# Patient Record
Sex: FEMALE | Race: WHITE | HISPANIC OR LATINO
[De-identification: names, ages, dates, MRNs, and addresses within clinical notes are randomized per-mention and may not be internally consistent; named-entity substitution may affect disease eponyms.]

---

## 2022-04-29 PROBLEM — Z00.00 ENCOUNTER FOR PREVENTIVE HEALTH EXAMINATION: Status: ACTIVE | Noted: 2022-04-29

## 2022-05-02 ENCOUNTER — APPOINTMENT (OUTPATIENT)
Dept: PEDIATRIC ORTHOPEDIC SURGERY | Facility: CLINIC | Age: 47
End: 2022-05-02
Payer: COMMERCIAL

## 2022-05-02 VITALS — BODY MASS INDEX: 26.68 KG/M2 | HEIGHT: 62 IN | WEIGHT: 145 LBS

## 2022-05-02 DIAGNOSIS — Z82.49 FAMILY HISTORY OF ISCHEMIC HEART DISEASE AND OTHER DISEASES OF THE CIRCULATORY SYSTEM: ICD-10-CM

## 2022-05-02 PROCEDURE — 72100 X-RAY EXAM L-S SPINE 2/3 VWS: CPT

## 2022-05-02 PROCEDURE — 99202 OFFICE O/P NEW SF 15 MIN: CPT

## 2022-05-02 RX ORDER — CYCLOBENZAPRINE HYDROCHLORIDE 10 MG/1
10 TABLET, FILM COATED ORAL DAILY
Qty: 30 | Refills: 0 | Status: ACTIVE | COMMUNITY
Start: 2022-05-02 | End: 1900-01-01

## 2022-05-02 NOTE — ASSESSMENT
[FreeTextEntry1] : Impression: Lumbar radiculitis.\par \par She will continue with gabapentin and cyclobenzaprine.  I have placed her on a 2-day course of prednisone with GI precautions sliding scale.  She will go back to physical therapy I will see her in 1 month the potential for MRI and pain management has been discussed

## 2022-05-03 RX ORDER — PREDNISONE 10 MG/1
10 TABLET ORAL 3 TIMES DAILY
Qty: 60 | Refills: 0 | Status: ACTIVE | COMMUNITY
Start: 2022-05-03 | End: 1900-01-01

## 2022-05-20 ENCOUNTER — APPOINTMENT (OUTPATIENT)
Dept: PEDIATRIC ORTHOPEDIC SURGERY | Facility: CLINIC | Age: 47
End: 2022-05-20
Payer: COMMERCIAL

## 2022-05-20 VITALS — WEIGHT: 145 LBS | BODY MASS INDEX: 26.68 KG/M2 | HEIGHT: 62 IN

## 2022-05-20 PROCEDURE — 99212 OFFICE O/P EST SF 10 MIN: CPT

## 2022-05-20 RX ORDER — MELOXICAM 15 MG/1
15 TABLET ORAL
Qty: 30 | Refills: 1 | Status: ACTIVE | COMMUNITY
Start: 2022-05-20 | End: 1900-01-01

## 2022-05-20 NOTE — HISTORY OF PRESENT ILLNESS
[de-identified] : She isThis 46-year-old returns she is a lot better with regards to pain relief following the 2-week course of prednisone.  She does still however have numbness and tingling radiating into the right lower extremity to the foot functioning a lot better.

## 2022-05-20 NOTE — ASSESSMENT
[FreeTextEntry1] : Impression: Lumbar radiculitis rule out herniated disc.\par \par She is scheduled to start a physical therapy this coming Monday.  She will discontinue the diclofenac and I placed her on Mobic.  She will return in 1 month.  MRI at this time we will hold off on as she is improving.

## 2022-05-20 NOTE — PHYSICAL EXAM
[de-identified] : On exam she walks well she has normal stance motion in the lumbar segment is much improved less spasm and tenderness straight leg raising is uncomfortable on the right though it is negative she remains neurologically intact

## 2022-05-31 ENCOUNTER — APPOINTMENT (OUTPATIENT)
Dept: PEDIATRIC ORTHOPEDIC SURGERY | Facility: CLINIC | Age: 47
End: 2022-05-31

## 2022-06-16 ENCOUNTER — APPOINTMENT (OUTPATIENT)
Dept: PEDIATRIC ORTHOPEDIC SURGERY | Facility: CLINIC | Age: 47
End: 2022-06-16
Payer: COMMERCIAL

## 2022-06-16 VITALS — HEIGHT: 62 IN | BODY MASS INDEX: 26.68 KG/M2 | WEIGHT: 145 LBS

## 2022-06-16 PROCEDURE — 99212 OFFICE O/P EST SF 10 MIN: CPT

## 2022-06-16 RX ORDER — MELOXICAM 15 MG/1
15 TABLET ORAL
Qty: 30 | Refills: 1 | Status: ACTIVE | COMMUNITY
Start: 2022-06-16 | End: 1900-01-01

## 2022-06-16 NOTE — ASSESSMENT
[FreeTextEntry1] : Impression: Lumbar radiculitis.\par \par She will continue with Mobic and physical therapy and will return in approximately 2 months.

## 2022-06-16 NOTE — HISTORY OF PRESENT ILLNESS
[de-identified] : This 46-year-old returns for reevaluation of back pain she continues to progressively improve she has very minimal radiation into the left foot no motor weakness bladder or bowel dysfunction.  She is in physical therapy and has noted improvement with therapy as well as Mobic

## 2022-12-16 NOTE — PHYSICAL EXAM
[de-identified] : On exam today she has a normal stance and gait she has painful motion to the lumbar segment with pain on both sides of the midline.  No obvious trigger points present.  Both lower extremities are symmetric in appearance and move well at all levels.  Straight leg raising is uncomfortable on the left though negative.  She is neurologically intact.\par \par X-rays of the lumbar spine taken today reveal mild spondylosis and narrowing at L4-5.  No lesion Admission

## 2024-04-02 ENCOUNTER — APPOINTMENT (OUTPATIENT)
Dept: PEDIATRIC ORTHOPEDIC SURGERY | Facility: CLINIC | Age: 49
End: 2024-04-02
Payer: COMMERCIAL

## 2024-04-02 VITALS
WEIGHT: 148.38 LBS | DIASTOLIC BLOOD PRESSURE: 92 MMHG | HEART RATE: 111 BPM | BODY MASS INDEX: 27.65 KG/M2 | SYSTOLIC BLOOD PRESSURE: 145 MMHG | TEMPERATURE: 97 F | HEIGHT: 61.4 IN

## 2024-04-02 PROCEDURE — 99213 OFFICE O/P EST LOW 20 MIN: CPT

## 2024-04-02 PROCEDURE — 72100 X-RAY EXAM L-S SPINE 2/3 VWS: CPT

## 2024-04-02 RX ORDER — DICLOFENAC SODIUM 75 MG/1
75 TABLET, DELAYED RELEASE ORAL TWICE DAILY
Qty: 60 | Refills: 1 | Status: ACTIVE | COMMUNITY
Start: 2024-04-02 | End: 1900-01-01

## 2024-04-02 NOTE — HISTORY OF PRESENT ILLNESS
[de-identified] : This 48-year-old is seen today for evaluation of the low back region.  This patient has been doing well unfortunately over the past 3-4 weeks she has had return of severe back pain radiating to lower extremities right greater than left.  No numbness paresthesias motor weakness bladder or bowel dysfunction.  2 weeks ago she presented to an urgent care center where she was sent home on Flexeril 5 mg along with a Medrol Dosepak she has had some improvement with this

## 2024-04-02 NOTE — PHYSICAL EXAM
[de-identified] : On exam today she has a straight spine and a normal gait on today's visit she has painful motion moderately restricted to the lumbar spine especially in flexion and rotation to both sides and midline.  Spasm and tenderness is noted on both sides more so on the right though no obvious trigger points present.  The posterior pelvic wall is unremarkable.  Both lower extremities are symmetric in appearance without atrophy and move well at all individual joints.  Straight leg raising is very uncomfortable on the right lateral negative.  She remains neurologically intact.  X-rays ordered and taken today of the lumbar spine reveal no significant interval change

## 2024-04-02 NOTE — ASSESSMENT
[FreeTextEntry1] : Impression: Lumbar radiculitis.  She has been placed on diclofenac with GI precautions and formal physical therapy has been ordered.  She will return in 3 weeks if she is not improving she may be placed on a course of prednisone.

## 2024-04-23 ENCOUNTER — APPOINTMENT (OUTPATIENT)
Dept: PEDIATRIC ORTHOPEDIC SURGERY | Facility: CLINIC | Age: 49
End: 2024-04-23
Payer: COMMERCIAL

## 2024-04-23 DIAGNOSIS — M54.16 RADICULOPATHY, LUMBAR REGION: ICD-10-CM

## 2024-04-23 PROCEDURE — 99212 OFFICE O/P EST SF 10 MIN: CPT

## 2024-04-23 RX ORDER — DICLOFENAC SODIUM 75 MG/1
75 TABLET, DELAYED RELEASE ORAL TWICE DAILY
Qty: 60 | Refills: 1 | Status: ACTIVE | COMMUNITY
Start: 2024-04-23 | End: 1900-01-01

## 2024-04-23 RX ORDER — DICLOFENAC SODIUM 1% 10 MG/G
1 GEL TOPICAL DAILY
Qty: 1 | Refills: 0 | Status: ACTIVE | COMMUNITY
Start: 2024-04-23 | End: 1900-01-01

## 2024-04-23 NOTE — ASSESSMENT
[FreeTextEntry1] : Impression: Lumbar radiculitis.  She will continue with physical therapy diclofenac and diclofenac gel return as needed

## 2024-04-23 NOTE — HISTORY OF PRESENT ILLNESS
[de-identified] : This 48-year-old returns for reevaluation of the lumbar segment.  She is doing better with medications she is functioning and physical therapy.

## 2024-04-23 NOTE — PHYSICAL EXAM
[de-identified] :   Her exam reveals normal gait good motion to the lumbar spine minimal spasm and tenderness no triggering noted.  Straight leg raising is negative she remains neurologically intact

## 2024-05-14 ENCOUNTER — APPOINTMENT (OUTPATIENT)
Dept: PEDIATRIC ORTHOPEDIC SURGERY | Facility: CLINIC | Age: 49
End: 2024-05-14

## 2024-07-30 ENCOUNTER — APPOINTMENT (OUTPATIENT)
Dept: PEDIATRIC ORTHOPEDIC SURGERY | Facility: CLINIC | Age: 49
End: 2024-07-30
Payer: COMMERCIAL

## 2024-07-30 VITALS — TEMPERATURE: 96.6 F | BODY MASS INDEX: 27.94 KG/M2 | HEIGHT: 61 IN | WEIGHT: 148 LBS

## 2024-07-30 VITALS — DIASTOLIC BLOOD PRESSURE: 79 MMHG | HEART RATE: 78 BPM | SYSTOLIC BLOOD PRESSURE: 131 MMHG

## 2024-07-30 DIAGNOSIS — M54.16 RADICULOPATHY, LUMBAR REGION: ICD-10-CM

## 2024-07-30 DIAGNOSIS — M79.18 MYALGIA, OTHER SITE: ICD-10-CM

## 2024-07-30 PROCEDURE — 20552 NJX 1/MLT TRIGGER POINT 1/2: CPT | Mod: RT

## 2024-07-30 PROCEDURE — 99213 OFFICE O/P EST LOW 20 MIN: CPT | Mod: 25

## 2024-07-30 RX ORDER — PREDNISONE 10 MG/1
10 TABLET ORAL
Qty: 60 | Refills: 0 | Status: ACTIVE | COMMUNITY
Start: 2024-07-30 | End: 1900-01-01

## 2024-07-30 RX ORDER — CYCLOBENZAPRINE HYDROCHLORIDE 10 MG/1
10 TABLET, FILM COATED ORAL TWICE DAILY
Qty: 30 | Refills: 1 | Status: ACTIVE | COMMUNITY
Start: 2024-07-30 | End: 1900-01-01

## 2024-07-30 NOTE — HISTORY OF PRESENT ILLNESS
[de-identified] : This 49-year-old returns with recurrent back pain radiating down the right lower extremity posteriorly no motor weakness bladder or bowel dysfunction.

## 2024-07-30 NOTE — PHYSICAL EXAM
[de-identified] : Exam reveals painful motion in all planes with spasm and tenderness and a trigger point in the right lumbar musculature.  Straight leg raising is uncomfortable on the right though negative she is neurologically intact

## 2024-07-30 NOTE — ASSESSMENT
[FreeTextEntry1] : Impression: Lumbar radiculitis/myalgias.  Injected the trigger point she is referred back to physical therapy I placed her on Flexeril along with a 2-week sliding scale of prednisone with GI precautions she will return if she is not progressing an MRI was likely to be ordered at that proves to be the case